# Patient Record
Sex: FEMALE | Race: WHITE | NOT HISPANIC OR LATINO | Employment: OTHER | URBAN - METROPOLITAN AREA
[De-identification: names, ages, dates, MRNs, and addresses within clinical notes are randomized per-mention and may not be internally consistent; named-entity substitution may affect disease eponyms.]

---

## 2019-06-10 ENCOUNTER — TRANSCRIBE ORDERS (OUTPATIENT)
Dept: LAB | Facility: CLINIC | Age: 69
End: 2019-06-10

## 2019-06-10 ENCOUNTER — APPOINTMENT (OUTPATIENT)
Dept: LAB | Facility: CLINIC | Age: 69
End: 2019-06-10
Payer: MEDICARE

## 2019-06-10 DIAGNOSIS — I10 ESSENTIAL HYPERTENSION, MALIGNANT: ICD-10-CM

## 2019-06-10 DIAGNOSIS — I10 ESSENTIAL HYPERTENSION, MALIGNANT: Primary | ICD-10-CM

## 2019-06-10 LAB
ALBUMIN SERPL BCP-MCNC: 3.8 G/DL (ref 3.5–5)
ALP SERPL-CCNC: 63 U/L (ref 46–116)
ALT SERPL W P-5'-P-CCNC: 28 U/L (ref 12–78)
ANION GAP SERPL CALCULATED.3IONS-SCNC: 6 MMOL/L (ref 4–13)
AST SERPL W P-5'-P-CCNC: 19 U/L (ref 5–45)
BASOPHILS # BLD AUTO: 0.03 THOUSANDS/ΜL (ref 0–0.1)
BASOPHILS NFR BLD AUTO: 1 % (ref 0–1)
BILIRUB SERPL-MCNC: 0.3 MG/DL (ref 0.2–1)
BUN SERPL-MCNC: 16 MG/DL (ref 5–25)
CALCIUM SERPL-MCNC: 9.2 MG/DL (ref 8.3–10.1)
CHLORIDE SERPL-SCNC: 106 MMOL/L (ref 100–108)
CHOLEST SERPL-MCNC: 220 MG/DL (ref 50–200)
CO2 SERPL-SCNC: 31 MMOL/L (ref 21–32)
CREAT SERPL-MCNC: 0.85 MG/DL (ref 0.6–1.3)
EOSINOPHIL # BLD AUTO: 0.14 THOUSAND/ΜL (ref 0–0.61)
EOSINOPHIL NFR BLD AUTO: 4 % (ref 0–6)
ERYTHROCYTE [DISTWIDTH] IN BLOOD BY AUTOMATED COUNT: 13.2 % (ref 11.6–15.1)
GFR SERPL CREATININE-BSD FRML MDRD: 71 ML/MIN/1.73SQ M
GLUCOSE P FAST SERPL-MCNC: 98 MG/DL (ref 65–99)
HCT VFR BLD AUTO: 41.9 % (ref 34.8–46.1)
HCV AB SER QL: NORMAL
HDLC SERPL-MCNC: 93 MG/DL (ref 40–60)
HGB BLD-MCNC: 13.3 G/DL (ref 11.5–15.4)
IMM GRANULOCYTES # BLD AUTO: 0.01 THOUSAND/UL (ref 0–0.2)
IMM GRANULOCYTES NFR BLD AUTO: 0 % (ref 0–2)
LDLC SERPL CALC-MCNC: 117 MG/DL (ref 0–100)
LYMPHOCYTES # BLD AUTO: 1.27 THOUSANDS/ΜL (ref 0.6–4.47)
LYMPHOCYTES NFR BLD AUTO: 36 % (ref 14–44)
MCH RBC QN AUTO: 30.1 PG (ref 26.8–34.3)
MCHC RBC AUTO-ENTMCNC: 31.7 G/DL (ref 31.4–37.4)
MCV RBC AUTO: 95 FL (ref 82–98)
MONOCYTES # BLD AUTO: 0.53 THOUSAND/ΜL (ref 0.17–1.22)
MONOCYTES NFR BLD AUTO: 15 % (ref 4–12)
NEUTROPHILS # BLD AUTO: 1.51 THOUSANDS/ΜL (ref 1.85–7.62)
NEUTS SEG NFR BLD AUTO: 44 % (ref 43–75)
NONHDLC SERPL-MCNC: 127 MG/DL
NRBC BLD AUTO-RTO: 0 /100 WBCS
PLATELET # BLD AUTO: 206 THOUSANDS/UL (ref 149–390)
PMV BLD AUTO: 11.6 FL (ref 8.9–12.7)
POTASSIUM SERPL-SCNC: 3.5 MMOL/L (ref 3.5–5.3)
PROT SERPL-MCNC: 7.4 G/DL (ref 6.4–8.2)
RBC # BLD AUTO: 4.42 MILLION/UL (ref 3.81–5.12)
SODIUM SERPL-SCNC: 143 MMOL/L (ref 136–145)
TRIGL SERPL-MCNC: 52 MG/DL
TSH SERPL DL<=0.05 MIU/L-ACNC: 3.13 UIU/ML (ref 0.36–3.74)
WBC # BLD AUTO: 3.49 THOUSAND/UL (ref 4.31–10.16)

## 2019-06-10 PROCEDURE — 85025 COMPLETE CBC W/AUTO DIFF WBC: CPT

## 2019-06-10 PROCEDURE — 36415 COLL VENOUS BLD VENIPUNCTURE: CPT

## 2019-06-10 PROCEDURE — 80053 COMPREHEN METABOLIC PANEL: CPT

## 2019-06-10 PROCEDURE — 84443 ASSAY THYROID STIM HORMONE: CPT

## 2019-06-10 PROCEDURE — 80061 LIPID PANEL: CPT

## 2019-06-10 PROCEDURE — 86803 HEPATITIS C AB TEST: CPT

## 2020-07-20 ENCOUNTER — TRANSCRIBE ORDERS (OUTPATIENT)
Dept: LAB | Facility: CLINIC | Age: 70
End: 2020-07-20

## 2020-07-20 ENCOUNTER — APPOINTMENT (OUTPATIENT)
Dept: LAB | Facility: CLINIC | Age: 70
End: 2020-07-20
Payer: MEDICARE

## 2020-07-20 DIAGNOSIS — I10 ESSENTIAL HYPERTENSION, MALIGNANT: Primary | ICD-10-CM

## 2020-07-20 DIAGNOSIS — I10 ESSENTIAL HYPERTENSION, MALIGNANT: ICD-10-CM

## 2020-07-20 LAB
ALBUMIN SERPL BCP-MCNC: 3.9 G/DL (ref 3.5–5)
ALP SERPL-CCNC: 65 U/L (ref 46–116)
ALT SERPL W P-5'-P-CCNC: 27 U/L (ref 12–78)
ANION GAP SERPL CALCULATED.3IONS-SCNC: 5 MMOL/L (ref 4–13)
AST SERPL W P-5'-P-CCNC: 23 U/L (ref 5–45)
BASOPHILS # BLD AUTO: 0.02 THOUSANDS/ΜL (ref 0–0.1)
BASOPHILS NFR BLD AUTO: 1 % (ref 0–1)
BILIRUB SERPL-MCNC: 0.35 MG/DL (ref 0.2–1)
BUN SERPL-MCNC: 16 MG/DL (ref 5–25)
CALCIUM SERPL-MCNC: 9 MG/DL (ref 8.3–10.1)
CHLORIDE SERPL-SCNC: 105 MMOL/L (ref 100–108)
CHOLEST SERPL-MCNC: 241 MG/DL (ref 50–200)
CO2 SERPL-SCNC: 31 MMOL/L (ref 21–32)
CREAT SERPL-MCNC: 0.77 MG/DL (ref 0.6–1.3)
EOSINOPHIL # BLD AUTO: 0.15 THOUSAND/ΜL (ref 0–0.61)
EOSINOPHIL NFR BLD AUTO: 4 % (ref 0–6)
ERYTHROCYTE [DISTWIDTH] IN BLOOD BY AUTOMATED COUNT: 13.3 % (ref 11.6–15.1)
GFR SERPL CREATININE-BSD FRML MDRD: 80 ML/MIN/1.73SQ M
GLUCOSE P FAST SERPL-MCNC: 98 MG/DL (ref 65–99)
HCT VFR BLD AUTO: 44.5 % (ref 34.8–46.1)
HDLC SERPL-MCNC: 89 MG/DL
HGB BLD-MCNC: 13.9 G/DL (ref 11.5–15.4)
IMM GRANULOCYTES # BLD AUTO: 0.01 THOUSAND/UL (ref 0–0.2)
IMM GRANULOCYTES NFR BLD AUTO: 0 % (ref 0–2)
LDLC SERPL CALC-MCNC: 138 MG/DL (ref 0–100)
LYMPHOCYTES # BLD AUTO: 1.36 THOUSANDS/ΜL (ref 0.6–4.47)
LYMPHOCYTES NFR BLD AUTO: 37 % (ref 14–44)
MCH RBC QN AUTO: 29.8 PG (ref 26.8–34.3)
MCHC RBC AUTO-ENTMCNC: 31.2 G/DL (ref 31.4–37.4)
MCV RBC AUTO: 95 FL (ref 82–98)
MONOCYTES # BLD AUTO: 0.51 THOUSAND/ΜL (ref 0.17–1.22)
MONOCYTES NFR BLD AUTO: 14 % (ref 4–12)
NEUTROPHILS # BLD AUTO: 1.65 THOUSANDS/ΜL (ref 1.85–7.62)
NEUTS SEG NFR BLD AUTO: 44 % (ref 43–75)
NONHDLC SERPL-MCNC: 152 MG/DL
NRBC BLD AUTO-RTO: 0 /100 WBCS
PLATELET # BLD AUTO: 186 THOUSANDS/UL (ref 149–390)
PMV BLD AUTO: 11.3 FL (ref 8.9–12.7)
POTASSIUM SERPL-SCNC: 4.3 MMOL/L (ref 3.5–5.3)
PROT SERPL-MCNC: 7.5 G/DL (ref 6.4–8.2)
RBC # BLD AUTO: 4.67 MILLION/UL (ref 3.81–5.12)
SODIUM SERPL-SCNC: 141 MMOL/L (ref 136–145)
TRIGL SERPL-MCNC: 70 MG/DL
TSH SERPL DL<=0.05 MIU/L-ACNC: 3.28 UIU/ML (ref 0.36–3.74)
WBC # BLD AUTO: 3.7 THOUSAND/UL (ref 4.31–10.16)

## 2020-07-20 PROCEDURE — 84443 ASSAY THYROID STIM HORMONE: CPT

## 2020-07-20 PROCEDURE — 80053 COMPREHEN METABOLIC PANEL: CPT

## 2020-07-20 PROCEDURE — 85025 COMPLETE CBC W/AUTO DIFF WBC: CPT

## 2020-07-20 PROCEDURE — 80061 LIPID PANEL: CPT

## 2020-07-20 PROCEDURE — 36415 COLL VENOUS BLD VENIPUNCTURE: CPT

## 2021-01-29 ENCOUNTER — TELEPHONE (OUTPATIENT)
Dept: SLEEP CENTER | Facility: CLINIC | Age: 71
End: 2021-01-29

## 2021-01-29 ENCOUNTER — OFFICE VISIT (OUTPATIENT)
Dept: SLEEP CENTER | Facility: CLINIC | Age: 71
End: 2021-01-29
Payer: MEDICARE

## 2021-01-29 VITALS
WEIGHT: 168 LBS | HEIGHT: 62 IN | DIASTOLIC BLOOD PRESSURE: 80 MMHG | BODY MASS INDEX: 30.91 KG/M2 | SYSTOLIC BLOOD PRESSURE: 140 MMHG

## 2021-01-29 DIAGNOSIS — Z20.822 ENCOUNTER FOR PREPROCEDURE SCREENING LABORATORY TESTING FOR COVID-19: Primary | ICD-10-CM

## 2021-01-29 DIAGNOSIS — Z01.812 ENCOUNTER FOR PREPROCEDURE SCREENING LABORATORY TESTING FOR COVID-19: Primary | ICD-10-CM

## 2021-01-29 DIAGNOSIS — G47.33 OSA (OBSTRUCTIVE SLEEP APNEA): Primary | ICD-10-CM

## 2021-01-29 DIAGNOSIS — Z71.89 CPAP USE COUNSELING: ICD-10-CM

## 2021-01-29 PROCEDURE — 99204 OFFICE O/P NEW MOD 45 MIN: CPT | Performed by: PSYCHIATRY & NEUROLOGY

## 2021-01-29 RX ORDER — VENLAFAXINE 75 MG/1
75 TABLET ORAL
COMMUNITY
Start: 2020-11-27

## 2021-01-29 RX ORDER — AMLODIPINE BESYLATE 10 MG/1
10 TABLET ORAL DAILY
COMMUNITY

## 2021-01-29 NOTE — PATIENT INSTRUCTIONS
Recommendations:  1) APAP 15-20cm with FFM and CPAP titration with change to BiPAP if needed  2) Driving safety was reviewed with patient  If the patient feels too sleepy to drive she knows not to drive  If she becomes sleepy while driving she will pull over and nap  3) Follow-up after testing  4) Call with any questions or concerns

## 2021-01-29 NOTE — LETTER
2021     Pierre Marley, [address] [facility] [address]    Patient: Naun Hawkins   YOB: 1950   Date of Visit: 2021       Dear Dr Jorden Solis: Thank you for referring Naun Hawkins to me for evaluation  Below are my notes for this consultation  If you have questions, please do not hesitate to call me  I look forward to following your patient along with you  Sincerely,        Jasper Padilla MD        CC: DO Jasper Shaw MD  2021  9:33 AM  Sign when Signing Visit  Sleep Medicine Consultation Note    HPI:  Ms Naun Hawkins is a 72 y o  female seen at the request of Nitin Rojo DO for advice regarding suspected sleep disordered breathing  The patient had an HST in 2018 and has been on CPAP since then  She is still complaining of having tiredness and sleepiness at times  She is considering the Radha device  Nitin Rojo DO has been managing her CPAP for years, but every time it is adjusted she is still tired  Her  stated that she snores lightly with her CPAP  When she takes it off, it is very loud  She does not like the CPAP and feels the mask is uncomfortable she has a FFM and it is too big and leaking  She changes her mask once a month and filters twice a month  NOVOSOM HST from 2018: AHI 3% 46 7, 4% 41 2, blanca oxygen 76%      Please see below for continuation of the HPI:    Compliance Card Data:    Date Range: 20-21   Settin-18cm; avg pressure 14cm; 90th% 17 5cm   Residual AHI: 14   Vibratory Snore Index: n a   %  Night in Large Leak: 0 sec   Average usage days used: 6h 37m   % Days used: 93%   % Days with USage > 4 hrs: 83%      Sleep Disordered Breathing:  -Snoring: yes   -Severity: loud   -Frequency: nightly   -Duration: many years   -Over time: better   -Modifying factors: CPAP helps  -Observed Apneas: no  -Mouth Breathing at night: yes  -Dry Mouth in morning: yes   -Nocturnal Gasping: yes  -Nasal Obstruction: no  -Weight: gained 5-10 lbs    Sleep Pattern:  -Location: bedroom   -Bed/Recliner/Wedge: bed  -Bed Partner: yes  -HOB: flat  -# of pillows under head: 1  -Position: side  -Bedtime: 1030-11pm  -Lights out: same time  -Environmental: No lights/TV  -Latency: can be right away or takes a long time  -Awakenings: 1-2   -Reason: feeling hot   -Duration: sometimes able to get back to sleep  -Wake time: 630-7am   -Alarm: no  -Rise time: same time  -Days off: same  -Shift Work: retired  -Patient's estimate of total sleep time: 7h    Daytime Symptoms:  -Upon Awakening: some days she feels tired  -Daytime fatigue/sleepiness: tired and sleepy in the afternoon  -Naps: once a day, but not daily  -Involuntary Dozing: yes  -Cognitive Symptoms: decreased memory  -Driving: Difficulty with sleepiness and driving:  denied   -- Close calls related to sleepiness: denied   -- Accidents related to sleepiness: denied      Questionnaires:   Sitting and reading: Moderate chance of dozing  Watching TV: Moderate chance of dozing  Sitting, inactive in a public place (e g  a theatre or a meeting): Would never doze  As a passenger in a car for an hour without a break: Slight chance of dozing  Lying down to rest in the afternoon when circumstances permit: High chance of dozing  Sitting and talking to someone: Would never doze  Sitting quietly after a lunch without alcohol: Slight chance of dozing  In a car, while stopped for a few minutes in traffic: Would never doze  Total score: 9      Sleep Review of Symptoms:  -Parasomnias:  --Sleep Walking: no  --Dream Enactment: no  --Bruxism: no  -Motor:  --RLS: no  --PLMS: no  -Narcolepsy:  --Hallucinations: no  --Paralysis: no  --Cataplexy: no    Childhood Sleep History: denied    Prior Sleep Studies/Evaluations:  HST 2018    Family History:  Family history of sleep disorders: sister with HERI and dad had HERI    There is no problem list on file for this patient      Past Medical History:   Diagnosis Date    Anxiety     Hypertension     HERI (obstructive sleep apnea)          --> Denies any cardiopulmonary disease  --> Seizure hx: denies  --> Head injury with LOC: denies  --> Supplemental Oxygen Use: denies    Labs     Results for Sarita Hendricks (MRN [id number]) as of 1/29/2021 08:44   Ref   Range 7/20/2020 08:19   Sodium Latest Ref Range: 136 - 145 mmol/L 141   Potassium Latest Ref Range: 3 5 - 5 3 mmol/L 4 3   Chloride Latest Ref Range: 100 - 108 mmol/L 105   CO2 Latest Ref Range: 21 - 32 mmol/L 31   Anion Gap Latest Ref Range: 4 - 13 mmol/L 5   BUN Latest Ref Range: 5 - 25 mg/dL 16   Creatinine Latest Ref Range: 0 60 - 1 30 mg/dL 0 77   GLUCOSE FASTING Latest Ref Range: 65 - 99 mg/dL 98   Calcium Latest Ref Range: 8 3 - 10 1 mg/dL 9 0   AST Latest Ref Range: 5 - 45 U/L 23   ALT Latest Ref Range: 12 - 78 U/L 27   Alkaline Phosphatase Latest Ref Range: 46 - 116 U/L 65   Total Protein Latest Ref Range: 6 4 - 8 2 g/dL 7 5   Albumin Latest Ref Range: 3 5 - 5 0 g/dL 3 9   TOTAL BILIRUBIN Latest Ref Range: 0 20 - 1 00 mg/dL 0 35   eGFR Latest Units: ml/min/1 73sq m 80   Cholesterol Latest Ref Range: 50 - 200 mg/dL 241 (H)   Triglycerides Latest Ref Range: <=150 mg/dL 70   HDL Latest Ref Range: >=40 mg/dL 89   Non-HDL Cholesterol Latest Units: mg/dl 152   LDL Calculated Latest Ref Range: 0 - 100 mg/dL 138 (H)   WBC Latest Ref Range: 4 31 - 10 16 Thousand/uL 3 70 (L)   Red Blood Cell Count Latest Ref Range: 3 81 - 5 12 Million/uL 4 67   Hemoglobin Latest Ref Range: 11 5 - 15 4 g/dL 13 9   HCT Latest Ref Range: 34 8 - 46 1 % 44 5   MCV Latest Ref Range: 82 - 98 fL 95   MCH Latest Ref Range: 26 8 - 34 3 pg 29 8   MCHC Latest Ref Range: 31 4 - 37 4 g/dL 31 2 (L)   RDW Latest Ref Range: 11 6 - 15 1 % 13 3   Platelet Count Latest Ref Range: 149 - 390 Thousands/uL 186   MPV Latest Ref Range: 8 9 - 12 7 fL 11 3   nRBC Latest Units: /100 WBCs 0   Neutrophils % Latest Ref Range: 43 - 75 % 44   Immat GRANS % Latest Ref Range: 0 - 2 % 0   Lymphocytes Relative Latest Ref Range: 14 - 44 % 37   Monocytes Relative Latest Ref Range: 4 - 12 % 14 (H)   Eosinophils Latest Ref Range: 0 - 6 % 4   Basophils Relative Latest Ref Range: 0 - 1 % 1   Immature Grans Absolute Latest Ref Range: 0 00 - 0 20 Thousand/uL 0 01   Absolute Neutrophils Latest Ref Range: 1 85 - 7 62 Thousands/µL 1 65 (L)   Lymphocytes Absolute Latest Ref Range: 0 60 - 4 47 Thousands/µL 1 36   Absolute Monocytes Latest Ref Range: 0 17 - 1 22 Thousand/µL 0 51   Absolute Eosinophils Latest Ref Range: 0 00 - 0 61 Thousand/µL 0 15   Basophils Absolute Latest Ref Range: 0 00 - 0 10 Thousands/µL 0 02   TSH 3RD GENERATON Latest Ref Range: 0 358 - 3 740 uIU/mL 3 278     No past surgical history on file  --> ENT procedures: denies    Current Outpatient Medications   Medication Sig Dispense Refill    amLODIPine (NORVASC) 10 mg tablet Take 10 mg by mouth daily      venlafaxine (EFFEXOR) 75 mg tablet 75 mg 1/2 tablet       No current facility-administered medications for this visit            Social History:  -Employment: retial manager retiered  -Smoking: no  -Caffeine: 16oz of coffee a day with 1/2 decaf  -Alcohol: 1-2 glasses of wine a night, but none since NYE  -THC: no  -OTC/Supplements/herbals: no  -Illicits:  denies  -Family: lives with     ROS:  Genitourinary none   Cardiology ankle/leg swelling   Gastrointestinal none   Neurology forgetfulness, poor concentration or confusion,  and difficulty with memory   Constitutional weight change   Integumentary none   Psychiatry none   Musculoskeletal none   Pulmonary shortness of breath with activity and snoring   ENT none   Endocrine none   Hematological none       MSE:  -Alert and appropriate: alert, calm, cooperative  -Oriented to person, place and time:  name, age, location, day/date/mon/yr  -Behavior: good, sustained eye contact  -Speech: Unremarkable rate/rhythm/volume  -Mood: "okay"  -Affect: full range  -Thought Processes: linear, logical, goal directed  PE:  Body mass index is 30 73 kg/m²  Vitals:    01/29/21 0832   BP: 140/80   Weight: 76 2 kg (168 lb)   Height: 5' 2" (1 575 m)       -General:  In NAD    -Eyes: Conjunctival injection: none     -EOM:  PERRLA, EOMI   -Eyelid hooding: yes    -ENT: MP: 4/4   -Facial deformity: no retrognathia   -Hard palate: moderate to high arch   -Soft palate:  Crowding and blocked by redundant tissue   -Gums and teeth: normal dentition   -Tongue:  Scalloping   -Nares:  Patent    -Neck/Lymphatics: Lymphadenopathy:  none appreciated   -Masses:  none appreciated   -Circumference: Neck Circumference: 15 "    -Cardiac: Auscultation:  RRR   - LE edema over shins: none appreciated    -Pulm: -Respirations: unlaboured         -Auscultation:  CTA bilaterally, posterior fields    -Neuro: No resting tremor     -Musculoskeletal: Gait and stance: normal turning and ambulation; unremarkable  Assessment:  Ms Elan Archer is a 72 y o  female who is seen to treat obstructive sleep apnea  She has severe HERI and on APAP 4-18cm (she has been compliant), but she is still having daytime tiredness and sleepiness  She is still lightly snoring on the current pressure  Her pressure needs are high and her APAP setting are insufficient based on her compliance report  Will increase her pressure range and put in for a CPAP to BiPAP titration as her pressure needs may exceed the capability of a CPAP  The pathophysiology of, the reasons to treat and treatment options for obstructive sleep apnea were all reviewed with the patient and her  today  We discussed Inspire and if she doesn't get full benefit from CPAP/BiPAP that adequately treats her HERI, will see Dr Neena Hills for [facility] evaluation  She is amenable to treatment with PAP therapy   Discussed keeping nasal passages clear, abstaining from alcohol, and other sedating drugs at night- which will worsen symptoms of HERI       --History provided by: patient and   --Records reviewed: in chart and HST, compliance report      Recommendations:  1) APAP 15-20cm with FFM and CPAP titration with change to BiPAP if needed  2) Driving safety was reviewed with patient  If the patient feels too sleepy to drive she knows not to drive  If she becomes sleepy while driving she will pull over and nap  3) Follow-up after testing  4) Call with any questions or concerns  All questions answered for the patient and , who indicated understanding and agreed with the plan       Adriel Manning MD  Psychiatry/ Sleep medicine

## 2021-01-29 NOTE — TELEPHONE ENCOUNTER
1/29 Patient informed that COVID testing is to be performed 10 days prior to PAP study  Patient is to tell site that it is needed for a procedure so it is expedited  Testing site information provided  -[place]

## 2021-01-29 NOTE — PROGRESS NOTES
Sleep Medicine Consultation Note    HPI:  Ms Aury Bonner is a 72 y o  female seen at the request of Maykel Nogueira DO for advice regarding suspected sleep disordered breathing  The patient had an HST in 2018 and has been on CPAP since then  She is still complaining of having tiredness and sleepiness at times  She is considering the [place] device  Maykel Nogueira DO has been managing her CPAP for years, but every time it is adjusted she is still tired  Her  stated that she snores lightly with her CPAP  When she takes it off, it is very loud  She does not like the CPAP and feels the mask is uncomfortable she has a FFM and it is too big and leaking  She changes her mask once a month and filters twice a month  NOVOSOM HST from 2018: AHI 3% 46 7, 4% 41 2, blanca oxygen 76%      Please see below for continuation of the HPI:    Compliance Card Data:    Date Range: 20-21   Settin-18cm; avg pressure 14cm; 90th% 17 5cm   Residual AHI: 14   Vibratory Snore Index: n a   %  Night in Large Leak: 0 sec   Average usage days used: 6h 37m   % Days used: 93%   % Days with USage > 4 hrs: 83%      Sleep Disordered Breathing:  -Snoring: yes   -Severity: loud   -Frequency: nightly   -Duration: many years   -Over time: better   -Modifying factors: CPAP helps  -Observed Apneas: no  -Mouth Breathing at night: yes  -Dry Mouth in morning: yes   -Nocturnal Gasping: yes  -Nasal Obstruction: no  -Weight: gained 5-10 lbs    Sleep Pattern:  -Location: bedroom   -Bed/Recliner/Wedge: bed  -Bed Partner: yes  -HOB: flat  -# of pillows under head: 1  -Position: side  -Bedtime: 1030-11pm  -Lights out: same time  -Environmental: No lights/TV  -Latency: can be right away or takes a long time  -Awakenings: 1-2   -Reason: feeling hot   -Duration: sometimes able to get back to sleep  -Wake time: 630-7am   -Alarm: no  -Rise time: same time  -Days off: same  -Shift Work: retired  -Patient's estimate of total sleep time: 7h    Daytime Symptoms:  -Upon Awakening: some days she feels tired  -Daytime fatigue/sleepiness: tired and sleepy in the afternoon  -Naps: once a day, but not daily  -Involuntary Dozing: yes  -Cognitive Symptoms: decreased memory  -Driving: Difficulty with sleepiness and driving:  denied   -- Close calls related to sleepiness: denied   -- Accidents related to sleepiness: denied      Questionnaires:   Sitting and reading: Moderate chance of dozing  Watching TV: Moderate chance of dozing  Sitting, inactive in a public place (e g  a theatre or a meeting): Would never doze  As a passenger in a car for an hour without a break: Slight chance of dozing  Lying down to rest in the afternoon when circumstances permit: High chance of dozing  Sitting and talking to someone: Would never doze  Sitting quietly after a lunch without alcohol: Slight chance of dozing  In a car, while stopped for a few minutes in traffic: Would never doze  Total score: 9      Sleep Review of Symptoms:  -Parasomnias:  --Sleep Walking: no  --Dream Enactment: no  --Bruxism: no  -Motor:  --RLS: no  --PLMS: no  -Narcolepsy:  --Hallucinations: no  --Paralysis: no  --Cataplexy: no    Childhood Sleep History: denied    Prior Sleep Studies/Evaluations:  HST 2018    Family History:  Family history of sleep disorders: sister with HERI and dad had HERI    There is no problem list on file for this patient  Past Medical History:   Diagnosis Date    Anxiety     Hypertension     HERI (obstructive sleep apnea)          --> Denies any cardiopulmonary disease  --> Seizure hx: denies  --> Head injury with LOC: denies  --> Supplemental Oxygen Use: denies    Labs     Results for Heather Herbert (MRN [id number]) as of 1/29/2021 08:44   Ref   Range 7/20/2020 08:19   Sodium Latest Ref Range: 136 - 145 mmol/L 141   Potassium Latest Ref Range: 3 5 - 5 3 mmol/L 4 3   Chloride Latest Ref Range: 100 - 108 mmol/L 105   CO2 Latest Ref Range: 21 - 32 mmol/L 31   Anion Gap Latest Ref Range: 4 - 13 mmol/L 5   BUN Latest Ref Range: 5 - 25 mg/dL 16   Creatinine Latest Ref Range: 0 60 - 1 30 mg/dL 0 77   GLUCOSE FASTING Latest Ref Range: 65 - 99 mg/dL 98   Calcium Latest Ref Range: 8 3 - 10 1 mg/dL 9 0   AST Latest Ref Range: 5 - 45 U/L 23   ALT Latest Ref Range: 12 - 78 U/L 27   Alkaline Phosphatase Latest Ref Range: 46 - 116 U/L 65   Total Protein Latest Ref Range: 6 4 - 8 2 g/dL 7 5   Albumin Latest Ref Range: 3 5 - 5 0 g/dL 3 9   TOTAL BILIRUBIN Latest Ref Range: 0 20 - 1 00 mg/dL 0 35   eGFR Latest Units: ml/min/1 73sq m 80   Cholesterol Latest Ref Range: 50 - 200 mg/dL 241 (H)   Triglycerides Latest Ref Range: <=150 mg/dL 70   HDL Latest Ref Range: >=40 mg/dL 89   Non-HDL Cholesterol Latest Units: mg/dl 152   LDL Calculated Latest Ref Range: 0 - 100 mg/dL 138 (H)   WBC Latest Ref Range: 4 31 - 10 16 Thousand/uL 3 70 (L)   Red Blood Cell Count Latest Ref Range: 3 81 - 5 12 Million/uL 4 67   Hemoglobin Latest Ref Range: 11 5 - 15 4 g/dL 13 9   HCT Latest Ref Range: 34 8 - 46 1 % 44 5   MCV Latest Ref Range: 82 - 98 fL 95   MCH Latest Ref Range: 26 8 - 34 3 pg 29 8   MCHC Latest Ref Range: 31 4 - 37 4 g/dL 31 2 (L)   RDW Latest Ref Range: 11 6 - 15 1 % 13 3   Platelet Count Latest Ref Range: 149 - 390 Thousands/uL 186   MPV Latest Ref Range: 8 9 - 12 7 fL 11 3   nRBC Latest Units: /100 WBCs 0   Neutrophils % Latest Ref Range: 43 - 75 % 44   Immat GRANS % Latest Ref Range: 0 - 2 % 0   Lymphocytes Relative Latest Ref Range: 14 - 44 % 37   Monocytes Relative Latest Ref Range: 4 - 12 % 14 (H)   Eosinophils Latest Ref Range: 0 - 6 % 4   Basophils Relative Latest Ref Range: 0 - 1 % 1   Immature Grans Absolute Latest Ref Range: 0 00 - 0 20 Thousand/uL 0 01   Absolute Neutrophils Latest Ref Range: 1 85 - 7 62 Thousands/µL 1 65 (L)   Lymphocytes Absolute Latest Ref Range: 0 60 - 4 47 Thousands/µL 1 36   Absolute Monocytes Latest Ref Range: 0 17 - 1 22 Thousand/µL 0 51   Absolute Eosinophils Latest Ref Range: 0 00 - 0 61 Thousand/µL 0 15   Basophils Absolute Latest Ref Range: 0 00 - 0 10 Thousands/µL 0 02   TSH 3RD GENERATON Latest Ref Range: 0 358 - 3 740 uIU/mL 3 278     No past surgical history on file  --> ENT procedures: denies    Current Outpatient Medications   Medication Sig Dispense Refill    amLODIPine (NORVASC) 10 mg tablet Take 10 mg by mouth daily      venlafaxine (EFFEXOR) 75 mg tablet 75 mg 1/2 tablet       No current facility-administered medications for this visit  Social History:  -Employment: retial manager retiered  -Smoking: no  -Caffeine: 16oz of coffee a day with 1/2 decaf  -Alcohol: 1-2 glasses of wine a night, but none since ROMANIE  -THC: no  -OTC/Supplements/herbals: no  -Illicits:  denies  -Family: lives with     ROS:  Genitourinary none   Cardiology ankle/leg swelling   Gastrointestinal none   Neurology forgetfulness, poor concentration or confusion,  and difficulty with memory   Constitutional weight change   Integumentary none   Psychiatry none   Musculoskeletal none   Pulmonary shortness of breath with activity and snoring   ENT none   Endocrine none   Hematological none       MSE:  -Alert and appropriate: alert, calm, cooperative  -Oriented to person, place and time:  name, age, location, day/date/mon/yr  -Behavior: good, sustained eye contact  -Speech: Unremarkable rate/rhythm/volume  -Mood: "okay"  -Affect: full range  -Thought Processes: linear, logical, goal directed  PE:  Body mass index is 30 73 kg/m²    Vitals:    01/29/21 0832   BP: 140/80   Weight: 76 2 kg (168 lb)   Height: 5' 2" (1 575 m)       -General:  In NAD    -Eyes: Conjunctival injection: none     -EOM:  PERRLA, EOMI   -Eyelid hooding: yes    -ENT: MP: 4/4   -Facial deformity: no retrognathia   -Hard palate: moderate to high arch   -Soft palate:  Crowding and blocked by redundant tissue   -Gums and teeth: normal dentition   -Tongue:  Scalloping   -Nares:  Patent    -Neck/Lymphatics: Lymphadenopathy:  none appreciated   -Masses:  none appreciated   -Circumference: Neck Circumference: 15 "    -Cardiac: Auscultation:  RRR   - LE edema over shins: none appreciated    -Pulm: -Respirations: unlaboured         -Auscultation:  CTA bilaterally, posterior fields    -Neuro: No resting tremor     -Musculoskeletal: Gait and stance: normal turning and ambulation; unremarkable  Assessment:  Ms Perlita Roberts is a 72 y o  female who is seen to treat obstructive sleep apnea  She has severe HERI and on APAP 4-18cm (she has been compliant), but she is still having daytime tiredness and sleepiness  She is still lightly snoring on the current pressure  Her pressure needs are high and her APAP setting are insufficient based on her compliance report  Will increase her pressure range and put in for a CPAP to BiPAP titration as her pressure needs may exceed the capability of a CPAP  The pathophysiology of, the reasons to treat and treatment options for obstructive sleep apnea were all reviewed with the patient and her  today  We discussed Inspire and if she doesn't get full benefit from CPAP/BiPAP that adequately treats her HERI, will see Dr Brooklyn Nevarez for [facility] evaluation  She is amenable to treatment with PAP therapy  Discussed keeping nasal passages clear, abstaining from alcohol, and other sedating drugs at night- which will worsen symptoms of HERI  --History provided by: patient and   --Records reviewed: in chart and HST, compliance report      Recommendations:  1) APAP 15-20cm with FFM and CPAP titration with change to BiPAP if needed  2) Driving safety was reviewed with patient  If the patient feels too sleepy to drive she knows not to drive  If she becomes sleepy while driving she will pull over and nap  3) Follow-up after testing  4) Call with any questions or concerns  All questions answered for the patient and , who indicated understanding and agreed with the plan  Aditi Cook MD  Psychiatry/ Sleep medicine

## 2021-02-03 ENCOUNTER — TELEPHONE (OUTPATIENT)
Dept: SLEEP CENTER | Facility: CLINIC | Age: 71
End: 2021-02-03

## 2021-02-03 NOTE — TELEPHONE ENCOUNTER
Left message for the patient to call back with DME provider information      Order in [place] for pressure change

## 2021-02-03 NOTE — TELEPHONE ENCOUNTER
Spoke to patient  States her DME provider is [facility] representative made aware of pressure change order

## 2021-02-16 ENCOUNTER — TELEPHONE (OUTPATIENT)
Dept: SLEEP CENTER | Facility: CLINIC | Age: 71
End: 2021-02-16

## 2021-02-16 NOTE — TELEPHONE ENCOUNTER
Received call from patient stating at her appointment with Dr Hua Castaneda last month, a pressure change was ordered for her CPAP machine and she does not think it was done  Spoke with [employer] representative who states it had not been completed at that time  Pressure change now completed    Patient made aware

## 2021-03-24 DIAGNOSIS — Z01.812 ENCOUNTER FOR PREPROCEDURE SCREENING LABORATORY TESTING FOR COVID-19: ICD-10-CM

## 2021-03-24 DIAGNOSIS — Z20.822 ENCOUNTER FOR PREPROCEDURE SCREENING LABORATORY TESTING FOR COVID-19: ICD-10-CM

## 2021-03-24 LAB — SARS-COV-2 RNA RESP QL NAA+PROBE: NEGATIVE

## 2021-03-24 PROCEDURE — U0005 INFEC AGEN DETEC AMPLI PROBE: HCPCS | Performed by: PSYCHIATRY & NEUROLOGY

## 2021-03-24 PROCEDURE — U0003 INFECTIOUS AGENT DETECTION BY NUCLEIC ACID (DNA OR RNA); SEVERE ACUTE RESPIRATORY SYNDROME CORONAVIRUS 2 (SARS-COV-2) (CORONAVIRUS DISEASE [COVID-19]), AMPLIFIED PROBE TECHNIQUE, MAKING USE OF HIGH THROUGHPUT TECHNOLOGIES AS DESCRIBED BY CMS-2020-01-R: HCPCS | Performed by: PSYCHIATRY & NEUROLOGY

## 2021-03-29 ENCOUNTER — TRANSCRIBE ORDERS (OUTPATIENT)
Dept: LAB | Facility: CLINIC | Age: 71
End: 2021-03-29

## 2021-04-02 ENCOUNTER — HOSPITAL ENCOUNTER (OUTPATIENT)
Dept: SLEEP CENTER | Facility: CLINIC | Age: 71
Discharge: HOME | End: 2021-04-02
Payer: MEDICARE

## 2021-04-02 DIAGNOSIS — Z71.89 CPAP USE COUNSELING: ICD-10-CM

## 2021-04-02 DIAGNOSIS — G47.33 OSA (OBSTRUCTIVE SLEEP APNEA): ICD-10-CM

## 2021-04-02 PROCEDURE — 95811 POLYSOM 6/>YRS CPAP 4/> PARM: CPT

## 2021-04-02 PROCEDURE — 95811 POLYSOM 6/>YRS CPAP 4/> PARM: CPT | Performed by: PSYCHIATRY & NEUROLOGY

## 2021-04-03 NOTE — PROGRESS NOTES
Sleep Study Documentation    Pre-Sleep Study       Sleep testing procedure explained to patient:YES    Patient napped prior to study:NO    [address] [place] worker after 12PM   Caffeine use:NO    Alcohol:Dayshift workers after 5PM: Alcohol use:NO    Typical day for patient:YES       Study Documentation    Sleep Study Indications: HERI, excessive daytime sleepiness  Sleep Study: Treatment   Optimal PAP pressure: 24/20cm  Leak:Small  Snore:Eliminated  REM Obtained:yes  Supplemental O2: no    Minimum SaO2 88%  Baseline SaO2 95%  PAP mask tried (list all) medium resmed airtouch F20  PAP mask choice (final) medium resmed airtouch F20  PAP mask type:full face  PAP pressure at which snoring was eliminated 14cm  Minimum SaO2 at final PAP pressure 89%  Mode of Therapy:BiPAP  ETCO2:No  CPAP changed to BiPAP:Yes  If yes why due to supine related obstructive events  obstructive     Mode of Therapy:BiPAP    EKG abnormalities: no     EEG abnormalities: no    Sleep Study Recorded < 2 hours: N/A    Sleep Study Recorded > 2 hours but incomplete study: N/A    Sleep Study Recorded 6 hours but no sleep obtained: NO    Patient classification: retired       Post-Sleep Study    Medication used at bedtime or during sleep study:NO    Patient reports time it took to fall asleep:30 to 60 minutes    Patient reports waking up during study:3 or more times  Patient reports returning to sleep in 10 to 30 minutes  Patient reports sleeping 4 to 6 hours without dreaming  Patient reports sleep during study:typical    Patient rated sleepiness: Somewhat sleepy or tired    PAP treatment:yes: Post PAP treatment patient reports feeling unsure if a change is noted and  would wear PAP mask at home

## 2021-04-06 DIAGNOSIS — G47.33 OSA (OBSTRUCTIVE SLEEP APNEA): Primary | ICD-10-CM

## 2021-04-13 ENCOUNTER — TELEPHONE (OUTPATIENT)
Dept: SLEEP CENTER | Facility: CLINIC | Age: 71
End: 2021-04-13

## 2021-04-13 NOTE — TELEPHONE ENCOUNTER
Spoke with patient, advised DR Silva recommends BiPAP    Scheduled DME set up 4/27/2021 in [facility] representative aware    Compliance follow up scheduled 6/3/2021

## 2021-04-13 NOTE — TELEPHONE ENCOUNTER
----- Message from Lionel Pope MD sent at 4/6/2021  1:51 PM EDT -----  CPAP study read  BiPAP ordered

## 2021-05-11 ENCOUNTER — TELEPHONE (OUTPATIENT)
Dept: SLEEP CENTER | Facility: CLINIC | Age: 71
End: 2021-05-11

## 2021-05-11 NOTE — TELEPHONE ENCOUNTER
Patient left voice message stating she recently started using BiPAP and is having difficulty  The pressure is too high  She is asking to have pressure lowered  Called patient  States she could not use the mask that was given to her at [facility] set up  She has been using the memory foam mask that she took home from the sleep study  States she has been getting in her 4 hours of compliance but has only been able to do so by repeatedly pushing the ramp button  Advised I will obtain compliance data from [facility] for Dr Rivka Wayne to review and will call her back with recommendations  Dr Rivka Wayne, please see compliance when available and advise

## 2021-05-17 ENCOUNTER — TELEPHONE (OUTPATIENT)
Dept: SLEEP CENTER | Facility: CLINIC | Age: 71
End: 2021-05-17

## 2021-05-17 DIAGNOSIS — G47.33 OSA (OBSTRUCTIVE SLEEP APNEA): Primary | ICD-10-CM

## 2021-05-17 NOTE — TELEPHONE ENCOUNTER
Spoke with patient, advised above  Advised pressure change will be given to [facility] representative, advised she might not notice change for 24-48 hours  Advised patient to call with questions or concerns prior to visit 6/3/2021    Luciano Chino, can you please make the change?   Thanks

## 2021-06-03 ENCOUNTER — OFFICE VISIT (OUTPATIENT)
Dept: SLEEP CENTER | Facility: CLINIC | Age: 71
End: 2021-06-03
Payer: MEDICARE

## 2021-06-03 VITALS
BODY MASS INDEX: 31.32 KG/M2 | DIASTOLIC BLOOD PRESSURE: 60 MMHG | WEIGHT: 170.2 LBS | SYSTOLIC BLOOD PRESSURE: 100 MMHG | HEIGHT: 62 IN

## 2021-06-03 DIAGNOSIS — G47.33 OSA (OBSTRUCTIVE SLEEP APNEA): Primary | ICD-10-CM

## 2021-06-03 PROCEDURE — 99213 OFFICE O/P EST LOW 20 MIN: CPT | Performed by: PSYCHIATRY & NEUROLOGY

## 2021-06-03 NOTE — PROGRESS NOTES
Sleep Medicine Follow-Up Note    HPI: 70yo F with HERI being seen for a follow up visit  Treatment Summary: The patient had an HST in 2018 and has been on CPAP since then  NOVOSO HST from 2018: AHI 3% 46 7, 4% 41 2, blanca oxygen 76%  CPAP study 2021: BiPAP 24/20cm and PLMD was 37  Patient called and complained that the pressure was too much and it was lowered to 22/18cm  HPI:   Today, patient presents unaccompanied  She stated that the pressure is high  When it was turned down it got a little better  She really likes the mask that she used during the study, but it cracked by the bridge of the nose  She got a new one, but the pressure seems so high  When the pressure was reduced, the pressure feels a little better  She is sleeping a little better now, but her  is kept awake by her BiPAP noise and breathing  She will remove it after 4 hours of sleep  She denied kicking her  in bed  Treatment: BiPAP  -Pressure: 22/18cm   -Pressure intolerance: yes   -Aerophagia: no   -Air Hunger: no  -Interface: FFM  -Fit: okay, leaking and blowing off of her face  -Chin strap: no  -HCC: YME  -Patient's perceived outcome: bothersome, but feeling less tired in the afternoon       Compliance Card Data:    - Date Range: 21-21   - Settin/18cm   - Residual AHI: 5 6   - Vibratory Snore Index: n/a   - %  Night in Large Leak: 53 min   - Average usage days used: 4h 46m   - % Days used: 87%   - % Days with Usage > 4 hrs: 77%    Respiratory:  -Ongoing Snoring: not with mask on  -Mouth Breathing: yes  -Dry Mouth: yes  -Nocturnal Gasping: no    ROS:   Genitourinary none   Cardiology ankle/leg swelling   Gastrointestinal none   Neurology need to move extremities and difficulty with memory   Constitutional fatigue and weight change   Integumentary itching   Psychiatry none   Musculoskeletal none   Pulmonary snoring   ENT none   Endocrine frequent urination   Hematological none       Sleep Pattern:  -Position: side  -Bedtime: 1030-11pm  -Lights out: same time  -Environmental: No lights/TV  -Latency: can be right away or takes a long time  -Awakenings: 0-1  -Wake time: 630-7am  -Patient's estimate of Sleep Time: 5-6h    Daytime Symptoms:  -Upon Awakening: sometimes tired  -Daytime fatigue/sleepiness: less tired  -Naps: no  -Involuntary Dozing: sometimes if watching TV  Not when reading  -Cognitive Symptoms: short term memory is  "shot"  -Driving: Difficulty with sleepiness and driving:  no   -- Close calls related to sleepiness: no   -- Accidents related to sleepiness: no    Substance Use:  -Caffeine: 2 cups in the morning of coffee  -Alcohol: occasionally a glass of wine  -THC: no    --> Denies any significant medical changes since last visit  --> Supplemental Oxygen Use: denies    Questionnaire:  Sitting and reading: Slight chance of dozing  Watching TV: Moderate chance of dozing  Sitting, inactive in a public place (e g  a theatre or a meeting): Slight chance of dozing  As a passenger in a car for an hour without a break: Slight chance of dozing  Lying down to rest in the afternoon when circumstances permit: Would never doze  Sitting and talking to someone: Would never doze  Sitting quietly after a lunch without alcohol: Slight chance of dozing  In a car, while stopped for a few minutes in traffic: Slight chance of dozing  Total score: 7      PE:    /60   Ht 5' 2" (1 575 m)   Wt 77 2 kg (170 lb 3 2 oz)   BMI 31 13 kg/m²     General:  In NAD  Pul: Respirations: unlaboured  MS: No atrophy  Neuro: No resting tremor  Gait normal turning & station; unremarkable overall  Psych: Socially appropriate  Pleasant  No overt dysphoria  Assessment: The patient has been compliant with her BiPAP and her obstructive events are relatively well controlled with a residual AHI 5 6  she was changed from 24/20 to 22/18cm as she was unable to keep the mask on her face   She is a little better now with the lower pressure, but still has trouble with mask fit  She is deriving benefit from using her BiPAP as she is less tired than she was in the past and no longer snoring  Will not make any changes to her pressure today, but will have her meet with DME about masks and elevated HOB to 30 degrees  We spoke about other options such as Inspire, but her  had it and is not fully happy with it  She denied PLMs  Recommendations:    1) BiPAP at 22/18cm with FFM and speak to DME about headgear  Try to elevated HOB about 30 degrees  2) Safe driving reviewed  3) Follow-up 2 months  4) Call with any questions or concerns  All questions answered for the patient, who indicated understanding and agreed with the plan       Jeri Murphy MD  Psychiatry/ Sleep medicine

## 2021-06-03 NOTE — PATIENT INSTRUCTIONS
Recommendations:    1) BiPAP at 22/18cm with FFM and speak to DME about headgear  Try to elevated HOB about 30 degrees  2) Safe driving reviewed  3) Follow-up 2 months  4) Call with any questions or concerns 
child seat

## 2021-08-19 ENCOUNTER — OFFICE VISIT (OUTPATIENT)
Dept: SLEEP CENTER | Facility: CLINIC | Age: 71
End: 2021-08-19
Payer: MEDICARE

## 2021-08-19 VITALS
SYSTOLIC BLOOD PRESSURE: 124 MMHG | WEIGHT: 165.2 LBS | HEIGHT: 62 IN | BODY MASS INDEX: 30.4 KG/M2 | DIASTOLIC BLOOD PRESSURE: 82 MMHG

## 2021-08-19 DIAGNOSIS — G47.33 OSA (OBSTRUCTIVE SLEEP APNEA): Primary | ICD-10-CM

## 2021-08-19 PROCEDURE — 99213 OFFICE O/P EST LOW 20 MIN: CPT | Performed by: PSYCHIATRY & NEUROLOGY

## 2021-08-19 RX ORDER — BIOTIN 1 MG
TABLET ORAL
COMMUNITY

## 2021-08-19 NOTE — PROGRESS NOTES
Sleep Medicine Follow-Up Note    HPI: 70yo F with HERI being seen for a follow up visit  Treatment Summary: The patient had an HST in 2018 and has been on CPAP since then  NOVOSO HST from 2018: AHI 3% 46 7, 4% 41 2, blanca oxygen 76%  CPAP study 2021: BiPAP 24/20cm and PLMD was 37  Patient called and complained that the pressure was too much and it was lowered to 22/18cm  HPI:   Today, patient presents unaccompanied  She has stopped using her BiPAP due to the recall  She is not feeling worse, but her snoring has come back and it is keeping her  awake  She has registered her machine with [employer]  She wakes up once to void  When she is active during the day, she is not tired during the day  If she is idle and watching TV, she falls asleep right away  Compliance Card Data:    - Date Range: 21-21   - Settin/18cm   - Residual AHI: 9 5   - Vibratory Snore Index: n/a   - %  Night in Large Leak: 0 sec   - Average usage days used: 5h 21m   - % Days used: 100%   - % Days with Usage > 4 hrs: 100%    Respiratory:  -Ongoing Snoring: yes  -Mouth Breathing: yes  -Dry Mouth: yes  -Nocturnal Gasping: no    ROS:   Genitourinary hot flashes at night   Cardiology none   Gastrointestinal none   Neurology forgetfulness, poor concentration or confusion,  and difficulty with memory   Constitutional excessive sweating at night   Integumentary itching   Psychiatry none   Musculoskeletal none   Pulmonary snoring   ENT none   Endocrine frequent urination   Hematological none       Sleep Pattern:  -Position: side  -Bedtime: 1030-11pm  -Lights out: same time  -Environmental: No lights/TV  -Latency: can be right away or takes a long time  -Awakenings: 0-1  -Wake time: 630-7am  -Patient's estimate of Sleep Time: 5-6h    Daytime Symptoms:  -Upon Awakening: not always refreshed   -Daytime fatigue/sleepiness: tired if idle and dozes off  Feels ok when she is active    -Naps: no  -Involuntary Dozing: yes  -Cognitive Symptoms: short term memory is  unchanged  -Driving: Difficulty with sleepiness and driving:  no   -- Close calls related to sleepiness: no   -- Accidents related to sleepiness: no    Substance Use:  -Caffeine: 2 cups in the morning of coffee  -Alcohol: occasionally a glass of wine  -THC: no    --> Denies any significant medical changes since last visit  --> Supplemental Oxygen Use: denies    Questionnaire:  Sitting and reading: Moderate chance of dozing  Watching TV: Moderate chance of dozing  Sitting, inactive in a public place (e g  a theatre or a meeting): Would never doze  As a passenger in a car for an hour without a break: Slight chance of dozing  Lying down to rest in the afternoon when circumstances permit: Slight chance of dozing  Sitting and talking to someone: Would never doze  Sitting quietly after a lunch without alcohol: Would never doze  In a car, while stopped for a few minutes in traffic: Would never doze  Total score: 6      PE:    /82   Ht 5' 2" (1 575 m)   Wt 74 9 kg (165 lb 3 2 oz)   BMI 30 22 kg/m²     General:  In NAD  Pul: Respirations: unlaboured  MS: No atrophy  Neuro: No resting tremor  Gait normal turning & station; unremarkable overall  Psych: Socially appropriate  Pleasant  No overt dysphoria  Assessment: The patient has been compliant with her BiPAP, but her HERI is not controlled with AHI of 9 5  she has stopped using her BiPAP due to the recall and is awaiting a new one or a fixed one from [employer]  Her snoring has come back full force and also she is dozing and tired when not active  She denied sleepiness while driving  Her machine is only a few months old and has a much lower likelihood of the foam being broken down that an older device  If she wishes she may restart using it, but she doesn't want a higher pressure  She can use it with a wedge pillow         Recommendations:    1) she wishes to wait until she gets a new machine from [employer]  She can try to use a wedge pillow to elevated HOB or try nasal silicone stents for snoring  2) Safe driving reviewed  3) Follow-up 9 months  4) Call with any questions or concerns  All questions answered for the patient, who indicated understanding and agreed with the plan       Misty Ragland MD  Psychiatry/ Sleep medicine

## 2021-08-19 NOTE — PATIENT INSTRUCTIONS
Recommendations:    1) she wishes to wait until she gets a new machine from [employer]  She can try to use a wedge pillow to elevated HOB or try nasal silicone stents for snoring  2) Safe driving reviewed  3) Follow-up 9 months  4) Call with any questions or concerns

## 2022-01-26 DIAGNOSIS — G47.33 OSA (OBSTRUCTIVE SLEEP APNEA): Primary | ICD-10-CM

## 2022-01-26 NOTE — PROGRESS NOTES
Mrs Meena Bains came to the visit today with her  James Peng  We discussed her frustrations with her BIPAP and she was wondering if any adjustments could be made  We discussed switching to an auto-titrating BIPAP  We will follow up formally in a new visit in a few months when viviane returns to the office

## 2022-01-27 ENCOUNTER — TELEPHONE (OUTPATIENT)
Dept: SLEEP CENTER | Facility: CLINIC | Age: 72
End: 2022-01-27

## 2022-01-27 NOTE — TELEPHONE ENCOUNTER
Progress Notes  Stephen Henry DO (Physician) [facility]  Mrs Concetta Araiza came to the visit today with her  Jimmy Diaz  We discussed her frustrations with her BIPAP and she was wondering if any adjustments could be made  We discussed switching to an auto-titrating BIPAP  We will follow up formally in a new visit in a few months when viviane returns to the office  Rx for pressure change emailed to [place]

## 2022-04-25 ENCOUNTER — OFFICE VISIT (OUTPATIENT)
Dept: SLEEP CENTER | Facility: CLINIC | Age: 72
End: 2022-04-25
Payer: MEDICARE

## 2022-04-25 ENCOUNTER — TELEPHONE (OUTPATIENT)
Dept: SLEEP CENTER | Facility: CLINIC | Age: 72
End: 2022-04-25

## 2022-04-25 VITALS
HEART RATE: 66 BPM | WEIGHT: 170 LBS | HEIGHT: 62 IN | BODY MASS INDEX: 31.28 KG/M2 | SYSTOLIC BLOOD PRESSURE: 138 MMHG | DIASTOLIC BLOOD PRESSURE: 70 MMHG

## 2022-04-25 DIAGNOSIS — G47.61 PERIODIC LIMB MOVEMENTS OF SLEEP: ICD-10-CM

## 2022-04-25 DIAGNOSIS — G47.33 OSA (OBSTRUCTIVE SLEEP APNEA): Primary | ICD-10-CM

## 2022-04-25 PROCEDURE — 99214 OFFICE O/P EST MOD 30 MIN: CPT | Performed by: INTERNAL MEDICINE

## 2022-04-25 NOTE — LETTER
April 25, 2022     Maty Beaulieu, [address] [address]    Patient: Obed Cool   YOB: 1950   Date of Visit: 4/25/2022       Dear Dr Shasta Coto: Thank you for referring Obed Cool to me for evaluation  Below are my notes for this consultation  If you have questions, please do not hesitate to call me  I look forward to following your patient along with you  Sincerely,        Neda Cantu DO        CC: No Recipients  Neda Cantu DO  4/25/2022 10:13 PM  Sign when Signing Visit  Progress Note - Sleep Medicine  Obed Cool 80 y o  female MRN: [id number]    Assessment/Plan  80 y o  F with PMHx of breast cancer who comes in for management of HERI on BIPAP  1   Severe HERI (SHERWIN - 47) on BIPAP 22/18 - good compliance and residual AHI down to 10  However, no significant improvement on BIPAP       Despite compliance, she struggles significantly with BIPAP  She does not feel that it has been helpful with her sleepiness  -  Switch to autoBIPAP to see if there is clinical improvement  -  New supply order placed  -  I also discussed in depth the risk of leaving sleep apnea untreated including hypertension, heart failure, arrhythmia, MI and stroke  -  The patient is agreeable to undergo testing and treatment of obstructive sleep apnea  She understands that pitfalls she may encounter along the way and is willing to attempt CPAP treatment  2  Periodic limb movement in sleep - she does not have symptoms of RLS  -  Treat HERI as above       -  If daytime sleepiness persists after the treatment of HERI, will consider treatment with Neurontin, Pregabalin, Requip or Mirapex     ______________________________________________________________________    HPI:    Obed Cool presents today for follow-up for her HERI  She has been using her BIPAP but has not had significant improvement on BiPAP  She struggles keeping it on    When she gets to 4 hrs she will take her BIPAP off and go to sleep  She denies does not feel improvement in daytime sleepiness  She notes mask leak  She still naps during the afternoon and does feel better with the nap  She does not have restless leg symptoms  She does not struggle to fall asleep            Review of Systems:  Review of Systems  Genitourinary none   Cardiology ankle/leg swelling   Gastrointestinal none   Neurology forgetfulness, poor concentration or confusion,  and difficulty with memory   Constitutional none   Integumentary itching   Psychiatry none   Musculoskeletal muscle aches, back pain and legs twitching/jerking   Pulmonary snoring   ENT none   Endocrine none   Hematological none         Social history updates:  Social History     Tobacco Use   Smoking Status Never Smoker   Smokeless Tobacco Never Used     Social History     Socioeconomic History    Marital status: /Civil Union     Spouse name: Not on file    Number of children: Not on file    Years of education: Not on file    Highest education level: Not on file   Occupational History    Not on file   Tobacco Use    Smoking status: Never Smoker    Smokeless tobacco: Never Used   Substance and Sexual Activity    Alcohol use: Not on file    Drug use: Not on file    Sexual activity: Not on file   Other Topics Concern    Not on file   Social History Narrative    Not on file     Social Determinants of Health     Financial Resource Strain: Not on file   Food Insecurity: Not on file   Transportation Needs: Not on file   Physical Activity: Not on file   Stress: Not on file   Social Connections: Not on file   Intimate Partner Violence: Not on file   Housing Stability: Not on file       PhysicalExamination:  Vitals:   /70 (BP Location: Left arm, Patient Position: Sitting, Cuff Size: Adult)   Pulse 66   Ht 5' 2" (1 575 m)   Wt 77 1 kg (170 lb)   BMI 31 09 kg/m²   General: Pleasant, Awake alert and oriented x 3, conversant without conversational dyspnea, NAD, normal affect  HEENT:  PERRL, Sclera noninjected, nonicteric OU, Nares patent,  no craniofacial abnormalities, Mucous membranes, moist, no oral lesions, normal dentition, Mallampati class 3  NECK: Trachea midline, no accessory muscle use, no stridor, no cervical or supraclavicular adenopathy, JVP not elevated  CARDIAC: Reg, single s1/S2, no m/r/g  PULM: CTA bilaterally no wheezing, rhonchi or rales  ABD: Normoactive bowel sounds, soft nontender, nondistended, no rebound, no rigidity, no guarding  EXT: No cyanosis, no clubbing, no edema, normal capillary refill  NEURO: no focal neurologic deficits, AAOx3, moving all extremities appropriately    Diagnostic Data:  Labs: I personally reviewed the most recent laboratory data pertinent to today's visit  I have personally reviewed pertinent lab results  Lab Results   Component Value Date    WBC 3 70 (L) 2020    HGB 13 9 2020    HCT 44 5 2020    MCV 95 2020     2020     Lab Results   Component Value Date    CALCIUM 9 0 2020    K 4 3 2020    CO2 31 2020     2020    BUN 16 2020    CREATININE 0 77 2020     No results found for: IGE  Lab Results   Component Value Date    ALT 27 2020    AST 23 2020    ALKPHOS 65 2020         Sleep studies:  HST in 2018 and has been on CPAP since then  [facility] HST from 2018: AHI 3% 46 7, 4% 41 2, blanca oxygen 76%  Titration:  BiPAP 24/20cm and PLMD was 37      Compliance Data:  3/26/22 - 22                                  Type of CPAP:  BIPAP 22/18                                   Percent usage: 97%                                   Average time used: 5 hrs 17 mins                                  Time in large leak: 0 secs                                   Residual AHI: 10 2                                                - Date Range: 21-21              - Settin/18cm              - Residual AHI: 9 5              - Vibratory Snore Index: n/a              - %  Night in Large Leak: 0 sec              - Average usage days used: 5h 21m              - % Days used: 100%              - % Days with Usage > 4 hrs: 100%    Darolyn Dancer, DO

## 2022-04-25 NOTE — PROGRESS NOTES
Review of Systems      Genitourinary none   Cardiology ankle/leg swelling   Gastrointestinal none   Neurology forgetfulness, poor concentration or confusion,  and difficulty with memory   Constitutional none   Integumentary itching   Psychiatry none   Musculoskeletal muscle aches, back pain and legs twitching/jerking   Pulmonary snoring   ENT none   Endocrine none   Hematological none

## 2022-04-26 ENCOUNTER — TELEPHONE (OUTPATIENT)
Dept: SLEEP CENTER | Facility: CLINIC | Age: 72
End: 2022-04-26

## 2022-04-26 NOTE — TELEPHONE ENCOUNTER
DME Pressure change and [facility] [facility] [place] [facility]

## 2022-04-26 NOTE — PROGRESS NOTES
Progress Note - Sleep Medicine  Denisha Varner 80 y o  female MRN: [id number]    Assessment/Plan  80 y o  F with PMHx of breast cancer who comes in for management of HERI on BIPAP  1   Severe HERI (SHERWIN - 47) on BIPAP 22/18 - good compliance and residual AHI down to 10  However, no significant improvement on BIPAP       Despite compliance, she struggles significantly with BIPAP  She does not feel that it has been helpful with her sleepiness  -  Switch to autoBIPAP to see if there is clinical improvement  -  New supply order placed  -  I also discussed in depth the risk of leaving sleep apnea untreated including hypertension, heart failure, arrhythmia, MI and stroke  -  The patient is agreeable to undergo testing and treatment of obstructive sleep apnea  She understands that pitfalls she may encounter along the way and is willing to attempt CPAP treatment  2  Periodic limb movement in sleep - she does not have symptoms of RLS  -  Treat HERI as above       -  If daytime sleepiness persists after the treatment of HERI, will consider treatment with Neurontin, Pregabalin, Requip or Mirapex     ______________________________________________________________________    HPI:    Denisha Varner presents today for follow-up for her HERI  She has been using her BIPAP but has not had significant improvement on BiPAP  She struggles keeping it on  When she gets to 4 hrs she will take her BIPAP off and go to sleep  She denies does not feel improvement in daytime sleepiness  She notes mask leak  She still naps during the afternoon and does feel better with the nap  She does not have restless leg symptoms  She does not struggle to fall asleep            Review of Systems:  Review of Systems  Genitourinary none   Cardiology ankle/leg swelling   Gastrointestinal none   Neurology forgetfulness, poor concentration or confusion,  and difficulty with memory   Constitutional none   Integumentary itching Psychiatry none   Musculoskeletal muscle aches, back pain and legs twitching/jerking   Pulmonary snoring   ENT none   Endocrine none   Hematological none         Social history updates:  Social History     Tobacco Use   Smoking Status Never Smoker   Smokeless Tobacco Never Used     Social History     Socioeconomic History    Marital status: /Civil Union     Spouse name: Not on file    Number of children: Not on file    Years of education: Not on file    Highest education level: Not on file   Occupational History    Not on file   Tobacco Use    Smoking status: Never Smoker    Smokeless tobacco: Never Used   Substance and Sexual Activity    Alcohol use: Not on file    Drug use: Not on file    Sexual activity: Not on file   Other Topics Concern    Not on file   Social History Narrative    Not on file     Social Determinants of Health     Financial Resource Strain: Not on file   Food Insecurity: Not on file   Transportation Needs: Not on file   Physical Activity: Not on file   Stress: Not on file   Social Connections: Not on file   Intimate Partner Violence: Not on file   Housing Stability: Not on file       PhysicalExamination:  Vitals:   /70 (BP Location: Left arm, Patient Position: Sitting, Cuff Size: Adult)   Pulse 66   Ht 5' 2" (1 575 m)   Wt 77 1 kg (170 lb)   BMI 31 09 kg/m²   General: Pleasant, Awake alert and oriented x 3, conversant without conversational dyspnea, NAD, normal affect  HEENT:  PERRL, Sclera noninjected, nonicteric OU, Nares patent,  no craniofacial abnormalities, Mucous membranes, moist, no oral lesions, normal dentition, Mallampati class 3  NECK: Trachea midline, no accessory muscle use, no stridor, no cervical or supraclavicular adenopathy, JVP not elevated  CARDIAC: Reg, single s1/S2, no m/r/g  PULM: CTA bilaterally no wheezing, rhonchi or rales  ABD: Normoactive bowel sounds, soft nontender, nondistended, no rebound, no rigidity, no guarding  EXT: No cyanosis, no clubbing, no edema, normal capillary refill  NEURO: no focal neurologic deficits, AAOx3, moving all extremities appropriately    Diagnostic Data:  Labs: I personally reviewed the most recent laboratory data pertinent to today's visit  I have personally reviewed pertinent lab results  Lab Results   Component Value Date    WBC 3 70 (L) 2020    HGB 13 9 2020    HCT 44 5 2020    MCV 95 2020     2020     Lab Results   Component Value Date    CALCIUM 9 0 2020    K 4 3 2020    CO2 31 2020     2020    BUN 16 2020    CREATININE 0 77 2020     No results found for: IGE  Lab Results   Component Value Date    ALT 27 2020    AST 23 2020    ALKPHOS 65 2020         Sleep studies:  HST in 2018 and has been on CPAP since then  NOVOSO HST from 2018: AHI 3% 46 7, 4% 41 2, blanca oxygen 76%  Titration:  BiPAP 24/20cm and PLMD was 37      Compliance Data:  3/26/22 - 22                                  Type of CPAP:  BIPAP 22/18                                   Percent usage: 97%                                   Average time used: 5 hrs 17 mins                                  Time in large leak: 0 secs                                   Residual AHI: 10 2                                                - Date Range: 21-21              - Settin/18cm              - Residual AHI: 9 5              - Vibratory Snore Index: n/a              - %  Night in Large Leak: 0 sec              - Average usage days used: 5h 21m              - % Days used: 100%              - % Days with Usage > 4 hrs: 100%    Charu Meyer DO

## 2022-11-09 ENCOUNTER — OFFICE VISIT (OUTPATIENT)
Dept: SLEEP CENTER | Facility: CLINIC | Age: 72
End: 2022-11-09

## 2022-11-09 VITALS
DIASTOLIC BLOOD PRESSURE: 65 MMHG | SYSTOLIC BLOOD PRESSURE: 132 MMHG | BODY MASS INDEX: 30.55 KG/M2 | HEIGHT: 62 IN | HEART RATE: 69 BPM | WEIGHT: 166 LBS

## 2022-11-09 DIAGNOSIS — G47.33 OSA (OBSTRUCTIVE SLEEP APNEA): Primary | ICD-10-CM

## 2022-11-09 DIAGNOSIS — G47.61 PERIODIC LIMB MOVEMENTS OF SLEEP: ICD-10-CM

## 2022-11-09 NOTE — PROGRESS NOTES
Review of Systems      Genitourinary none   Cardiology none   Gastrointestinal none   Neurology none   Constitutional none   Integumentary none   Psychiatry none   Musculoskeletal none   Pulmonary shortness of breath with activity and snoring   ENT none   Endocrine none   Hematological none

## 2022-11-09 NOTE — LETTER
November 12, 2022     Deandre Sargent, [address] [place] [address]    Patient: Ahmet Carrasco   YOB: 1950   Date of Visit: 11/9/2022       Dear Dr Tenisha Garza: Thank you for referring Ahmet Carrasco to me for evaluation  Below are my notes for this consultation  If you have questions, please do not hesitate to call me  I look forward to following your patient along with you  Sincerely,        Ángel Babin DO        CC: No Recipients  Ángel Babin DO  11/12/2022  8:55 AM  Sign when Signing Visit  Progress Note - Sleep Medicine  Ahmet Carrasco 71 y o  female MRN: [id number]       Impression & Plan:   71 y o  F with PMHx of breast cancer who comes in for management of HERI on BIPAP  1   Severe HERI (SHERWIN - 47) on autoBIPAP with mild improvement in AHI down from 10 to 8 5 but still states that mask leak tends to be her biggest issue  She also has suboptimal compliance and will take the mask off after 4 hrs  She does not feel that BIPAP has been helpful with her sleepiness         -  Due to mask leak becoming a persistent problem, we will cap the BIPAP pressure at max IPAP of 21  We will accept more respiratory events as a result but perhaps she will be able to sleep through the night for extended periods      -  We discussed mask change that may possible help as well      -  We also did discuss combined BIPAP therapy with positional therapy or inspire therapy  She prefers to avoid going through surgery given her husbands initial frustration with Adriane Schreiber  She will contemplate it as a possibility          -  I also discussed in depth the risk of leaving sleep apnea untreated including hypertension, heart failure, arrhythmia, MI and stroke      2  Periodic limb movement in sleep - she does not have symptoms of RLS           -  Treat HERI as above       -  If she is still struggling with sleep quality, we can consider gabapentin at next visit to help PLMs and sleep quality  ______________________________________________________________________    HPI:    Naun Hawkins presents today for follow-up for her HERI  She states that since last visit, the change to [facility] has on some nights, been more helpful for her sleep quality  However, in the middle of the night she will note significant leaking from the mask  She is worried that it is interrupting her 's sleep quality and she too will get frequent interruptions  However, on the nights that she does sleep through, she feels better  However, these are infrequent  She notes morning headache and dry mouth  She also will nap during the day because she is feeling unrested  She also ntoes nasal congestion and air leaks  She will take the mask off at night and then she is snoring significantly            Review of Systems:  Review of Systems  Genitourinary none   Cardiology none   Gastrointestinal none   Neurology none   Constitutional none   Integumentary none   Psychiatry none   Musculoskeletal none   Pulmonary shortness of breath with activity and snoring   ENT none   Endocrine none   Hematological none       Social history updates:  Social History     Tobacco Use   Smoking Status Never Smoker   Smokeless Tobacco Never Used     Social History     Socioeconomic History   • Marital status: /Civil Union     Spouse name: Not on file   • Number of children: Not on file   • Years of education: Not on file   • Highest education level: Not on file   Occupational History   • Not on file   Tobacco Use   • Smoking status: Never Smoker   • Smokeless tobacco: Never Used   Substance and Sexual Activity   • Alcohol use: Not on file   • Drug use: Not on file   • Sexual activity: Not on file   Other Topics Concern   • Not on file   Social History Narrative   • Not on file     Social Determinants of Health     Financial Resource Strain: Not on file   Food Insecurity: Not on file   Transportation Needs: Not on file   Physical Activity: Not on file   Stress: Not on file   Social Connections: Not on file   Intimate Partner Violence: Not on file   Housing Stability: Not on file       PhysicalExamination:  Vitals:   /65 (BP Location: Left arm, Patient Position: Sitting, Cuff Size: Large)   Pulse 69   Ht 5' 2" (1 575 m)   Wt 75 3 kg (166 lb)   BMI 30 36 kg/m²   General: Pleasant, Awake alert and oriented x 3, conversant without conversational dyspnea, NAD, normal affect  HEENT:  PERRL, Sclera noninjected, nonicteric OU, Nares patent,  no craniofacial abnormalities, Mucous membranes, moist, no oral lesions, normal dentition  NECK: Trachea midline, no accessory muscle use, no stridor, no cervical or supraclavicular adenopathy, JVP not elevated  CARDIAC: Reg, single s1/S2, no m/r/g  PULM: CTA bilaterally no wheezing, rhonchi or rales  ABD: Normoactive bowel sounds, soft nontender, nondistended, no rebound, no rigidity, no guarding  EXT: No cyanosis, no clubbing, no edema, normal capillary refill  NEURO: no focal neurologic deficits, AAOx3, moving all extremities appropriately      Diagnostic Data:  Labs: I personally reviewed the most recent laboratory data pertinent to today's visit  I have personally reviewed pertinent lab results  Lab Results   Component Value Date    WBC 3 70 (L) 07/20/2020    HGB 13 9 07/20/2020    HCT 44 5 07/20/2020    MCV 95 07/20/2020     07/20/2020     Lab Results   Component Value Date    CALCIUM 9 0 07/20/2020    K 4 3 07/20/2020    CO2 31 07/20/2020     07/20/2020    BUN 16 07/20/2020    CREATININE 0 77 07/20/2020     No results found for: IGE  Lab Results   Component Value Date    ALT 27 07/20/2020    AST 23 07/20/2020    ALKPHOS 65 07/20/2020       Sleep studies:  HST in 2018 and has been on CPAP since then  [facility] HST from 5/2018: AHI 3% 46 7, 4% 41 2, blanca oxygen 76%    Titration:  BiPAP 24/20cm and PLMD was 37      New Compliance Data:  10/8/22 - 11/6/22 Type of CPAP:  AutoBIPAP min EPAP 12, PSV 4-6, max IPAP 25                                       Percent usage: 93% > 4 hrs 63%                                   Average time used: 4 hrs 53 mins                                  Time in large leak: 0 secs, mask fit 97%                                   Residual AHI: 8 5 (SAMI - 1)    Compliance Data:  3/26/22 - 22                                  Type of CPAP:  BIPAP 22/                                   Percent usage: 97%                                   Average time used: 5 hrs 17 mins                                  Time in large leak: 0 secs                                   Residual AHI: 10 2                                                 - Date Range: 21-21              - Settin/18cm              - Residual AHI: 9 5              - Vibratory Snore Index: n/a              - %  Night in Large Leak: 0 sec              - Average usage days used: 5h 21m              - % Days used: 100%              - % Days with Usage > 4 hrs: 100%         Lion Cochran DO

## 2022-11-10 ENCOUNTER — TELEPHONE (OUTPATIENT)
Dept: SLEEP CENTER | Facility: CLINIC | Age: 72
End: 2022-11-10

## 2022-11-10 LAB
DME PARACHUTE DELIVERY DATE REQUESTED: NORMAL
DME PARACHUTE ITEM DESCRIPTION: NORMAL
DME PARACHUTE ORDER STATUS: NORMAL
DME PARACHUTE SUPPLIER NAME: NORMAL
DME PARACHUTE SUPPLIER PHONE: NORMAL

## 2022-11-12 NOTE — PROGRESS NOTES
Progress Note - Sleep Medicine  Tal Gutierrez 71 y o  female MRN: [id number]       Impression & Plan:   71 y o  F with PMHx of breast cancer who comes in for management of HERI on BIPAP  1   Severe HERI (SHERWIN - 47) on autoBIPAP with mild improvement in AHI down from 10 to 8 5 but still states that mask leak tends to be her biggest issue  She also has suboptimal compliance and will take the mask off after 4 hrs  She does not feel that BIPAP has been helpful with her sleepiness         -  Due to mask leak becoming a persistent problem, we will cap the BIPAP pressure at max IPAP of 21  We will accept more respiratory events as a result but perhaps she will be able to sleep through the night for extended periods      -  We discussed mask change that may possible help as well      -  We also did discuss combined BIPAP therapy with positional therapy or inspire therapy  She prefers to avoid going through surgery given her husbands initial frustration with Reinaldo Fernández  She will contemplate it as a possibility          -  I also discussed in depth the risk of leaving sleep apnea untreated including hypertension, heart failure, arrhythmia, MI and stroke      2  Periodic limb movement in sleep - she does not have symptoms of RLS  -  Treat HERI as above       -  If she is still struggling with sleep quality, we can consider gabapentin at next visit to help PLMs and sleep quality  ______________________________________________________________________    HPI:    Tal Gutierrez presents today for follow-up for her HERI  She states that since last visit, the change to [facility] has on some nights, been more helpful for her sleep quality  However, in the middle of the night she will note significant leaking from the mask  She is worried that it is interrupting her 's sleep quality and she too will get frequent interruptions  However, on the nights that she does sleep through, she feels better    However, these are infrequent  She notes morning headache and dry mouth  She also will nap during the day because she is feeling unrested  She also ntoes nasal congestion and air leaks  She will take the mask off at night and then she is snoring significantly            Review of Systems:  Review of Systems  Genitourinary none   Cardiology none   Gastrointestinal none   Neurology none   Constitutional none   Integumentary none   Psychiatry none   Musculoskeletal none   Pulmonary shortness of breath with activity and snoring   ENT none   Endocrine none   Hematological none       Social history updates:  Social History     Tobacco Use   Smoking Status Never Smoker   Smokeless Tobacco Never Used     Social History     Socioeconomic History   • Marital status: /Civil Union     Spouse name: Not on file   • Number of children: Not on file   • Years of education: Not on file   • Highest education level: Not on file   Occupational History   • Not on file   Tobacco Use   • Smoking status: Never Smoker   • Smokeless tobacco: Never Used   Substance and Sexual Activity   • Alcohol use: Not on file   • Drug use: Not on file   • Sexual activity: Not on file   Other Topics Concern   • Not on file   Social History Narrative   • Not on file     Social Determinants of Health     Financial Resource Strain: Not on file   Food Insecurity: Not on file   Transportation Needs: Not on file   Physical Activity: Not on file   Stress: Not on file   Social Connections: Not on file   Intimate Partner Violence: Not on file   Housing Stability: Not on file       PhysicalExamination:  Vitals:   /65 (BP Location: Left arm, Patient Position: Sitting, Cuff Size: Large)   Pulse 69   Ht 5' 2" (1 575 m)   Wt 75 3 kg (166 lb)   BMI 30 36 kg/m²   General: Pleasant, Awake alert and oriented x 3, conversant without conversational dyspnea, NAD, normal affect  HEENT:  PERRL, Sclera noninjected, nonicteric OU, Nares patent,  no craniofacial abnormalities, Mucous membranes, moist, no oral lesions, normal dentition  NECK: Trachea midline, no accessory muscle use, no stridor, no cervical or supraclavicular adenopathy, JVP not elevated  CARDIAC: Reg, single s1/S2, no m/r/g  PULM: CTA bilaterally no wheezing, rhonchi or rales  ABD: Normoactive bowel sounds, soft nontender, nondistended, no rebound, no rigidity, no guarding  EXT: No cyanosis, no clubbing, no edema, normal capillary refill  NEURO: no focal neurologic deficits, AAOx3, moving all extremities appropriately      Diagnostic Data:  Labs: I personally reviewed the most recent laboratory data pertinent to today's visit  I have personally reviewed pertinent lab results  Lab Results   Component Value Date    WBC 3 70 (L) 07/20/2020    HGB 13 9 07/20/2020    HCT 44 5 07/20/2020    MCV 95 07/20/2020     07/20/2020     Lab Results   Component Value Date    CALCIUM 9 0 07/20/2020    K 4 3 07/20/2020    CO2 31 07/20/2020     07/20/2020    BUN 16 07/20/2020    CREATININE 0 77 07/20/2020     No results found for: IGE  Lab Results   Component Value Date    ALT 27 07/20/2020    AST 23 07/20/2020    ALKPHOS 65 07/20/2020       Sleep studies:  HST in 2018 and has been on CPAP since then  [facility] HST from 5/2018: AHI 3% 46 7, 4% 41 2, blanca oxygen 76%  Titration:  BiPAP 24/20cm and PLMD was 37      New Compliance Data:  10/8/22 - 11/6/22                                  Type of CPAP:  AutoBIPAP min EPAP 12, PSV 4-6, max IPAP 25                                       Percent usage: 93% > 4 hrs 63%                                   Average time used: 4 hrs 53 mins                                  Time in large leak: 0 secs, mask fit 97%                                   Residual AHI: 8 5 (SAMI - 1)    Compliance Data:  3/26/22 - 4/24/22                                  Type of CPAP:  BIPAP 22/18                                   Percent usage: 97%                                   Average time used: 5 hrs 17 mins Time in large leak: 0 secs                                   Residual AHI: 10 2                                                 - Date Range: 21-21              - Settin/18cm              - Residual AHI: 9 5              - Vibratory Snore Index: n/a              - %  Night in Large Leak: 0 sec              - Average usage days used: 5h 21m              - % Days used: 100%              - % Days with Usage > 4 hrs: 100%         Gabriela Altamirano DO

## 2022-11-30 ENCOUNTER — TELEPHONE (OUTPATIENT)
Dept: SLEEP CENTER | Facility: CLINIC | Age: 72
End: 2022-11-30

## 2022-11-30 ENCOUNTER — PATIENT MESSAGE (OUTPATIENT)
Dept: SLEEP CENTER | Facility: CLINIC | Age: 72
End: 2022-11-30

## 2022-11-30 NOTE — TELEPHONE ENCOUNTER
Replied to patient's MyChart message  Advised change was completed today and can take 24-48 hours to change on her machine

## 2022-11-30 NOTE — TELEPHONE ENCOUNTER
Patient's  sent Break[facility] message stating he does not think the pressure change was ever completed on his wife's machine  Rx for pressure change was sent via [place] on 11/10/22  [facility] or [facility], can you confirm if pressure change completed?   Per script:  Machine Type: BiPAP   Machine Type: Auto   Max IPAP (cm H2O 4-25) 21   Minimum EPAP (cm H2O) 12   Min Pressure Support (cm H2O) 4

## 2022-12-28 ENCOUNTER — TELEPHONE (OUTPATIENT)
Dept: SLEEP CENTER | Facility: CLINIC | Age: 72
End: 2022-12-28

## 2022-12-28 DIAGNOSIS — G47.33 OSA (OBSTRUCTIVE SLEEP APNEA): Primary | ICD-10-CM

## 2022-12-28 NOTE — TELEPHONE ENCOUNTER
Pressure changed to 21/12cm PS 4cm I have reached out to Dr Pinky Stanton b/c the machine is a Eileen and we need 2 pressure supports on the rx  I called the pt   And LVM telling her the change was made and allow 24 hr

## 2022-12-29 LAB
DME PARACHUTE DELIVERY DATE REQUESTED: NORMAL
DME PARACHUTE DELIVERY NOTE: NORMAL
DME PARACHUTE ITEM DESCRIPTION: NORMAL
DME PARACHUTE ORDER STATUS: NORMAL
DME PARACHUTE SUPPLIER NAME: NORMAL
DME PARACHUTE SUPPLIER PHONE: NORMAL

## 2023-02-01 LAB
DME PARACHUTE DELIVERY DATE ACTUAL: NORMAL
DME PARACHUTE DELIVERY DATE REQUESTED: NORMAL
DME PARACHUTE DELIVERY NOTE: NORMAL
DME PARACHUTE ITEM DESCRIPTION: NORMAL
DME PARACHUTE ORDER STATUS: NORMAL
DME PARACHUTE SUPPLIER NAME: NORMAL
DME PARACHUTE SUPPLIER PHONE: NORMAL

## 2025-03-28 ENCOUNTER — APPOINTMENT (OUTPATIENT)
Dept: LAB | Facility: CLINIC | Age: 75
End: 2025-03-28
Payer: MEDICARE

## 2025-03-28 DIAGNOSIS — E78.5 HYPERLIPIDEMIA, UNSPECIFIED HYPERLIPIDEMIA TYPE: ICD-10-CM

## 2025-03-28 DIAGNOSIS — Z00.00 ROUTINE GENERAL MEDICAL EXAMINATION AT A HEALTH CARE FACILITY: ICD-10-CM

## 2025-03-28 LAB
ALBUMIN SERPL BCG-MCNC: 4.5 G/DL (ref 3.5–5)
ALP SERPL-CCNC: 49 U/L (ref 34–104)
ALT SERPL W P-5'-P-CCNC: 15 U/L (ref 7–52)
ANION GAP SERPL CALCULATED.3IONS-SCNC: 6 MMOL/L (ref 4–13)
AST SERPL W P-5'-P-CCNC: 19 U/L (ref 13–39)
BASOPHILS # BLD AUTO: 0.03 THOUSANDS/ÂΜL (ref 0–0.1)
BASOPHILS NFR BLD AUTO: 1 % (ref 0–1)
BILIRUB SERPL-MCNC: 0.54 MG/DL (ref 0.2–1)
BUN SERPL-MCNC: 19 MG/DL (ref 5–25)
CALCIUM SERPL-MCNC: 9.8 MG/DL (ref 8.4–10.2)
CHLORIDE SERPL-SCNC: 107 MMOL/L (ref 96–108)
CHOLEST SERPL-MCNC: 225 MG/DL (ref ?–200)
CO2 SERPL-SCNC: 29 MMOL/L (ref 21–32)
CREAT SERPL-MCNC: 1 MG/DL (ref 0.6–1.3)
EOSINOPHIL # BLD AUTO: 0.14 THOUSAND/ÂΜL (ref 0–0.61)
EOSINOPHIL NFR BLD AUTO: 4 % (ref 0–6)
ERYTHROCYTE [DISTWIDTH] IN BLOOD BY AUTOMATED COUNT: 13.2 % (ref 11.6–15.1)
GFR SERPL CREATININE-BSD FRML MDRD: 56 ML/MIN/1.73SQ M
GLUCOSE P FAST SERPL-MCNC: 101 MG/DL (ref 65–99)
HCT VFR BLD AUTO: 46 % (ref 34.8–46.1)
HDLC SERPL-MCNC: 78 MG/DL
HGB BLD-MCNC: 14.7 G/DL (ref 11.5–15.4)
IMM GRANULOCYTES # BLD AUTO: 0.02 THOUSAND/UL (ref 0–0.2)
IMM GRANULOCYTES NFR BLD AUTO: 1 % (ref 0–2)
LDLC SERPL CALC-MCNC: 131 MG/DL (ref 0–100)
LYMPHOCYTES # BLD AUTO: 1.43 THOUSANDS/ÂΜL (ref 0.6–4.47)
LYMPHOCYTES NFR BLD AUTO: 36 % (ref 14–44)
MCH RBC QN AUTO: 29.9 PG (ref 26.8–34.3)
MCHC RBC AUTO-ENTMCNC: 32 G/DL (ref 31.4–37.4)
MCV RBC AUTO: 94 FL (ref 82–98)
MONOCYTES # BLD AUTO: 0.58 THOUSAND/ÂΜL (ref 0.17–1.22)
MONOCYTES NFR BLD AUTO: 15 % (ref 4–12)
NEUTROPHILS # BLD AUTO: 1.77 THOUSANDS/ÂΜL (ref 1.85–7.62)
NEUTS SEG NFR BLD AUTO: 43 % (ref 43–75)
NONHDLC SERPL-MCNC: 147 MG/DL
NRBC BLD AUTO-RTO: 0 /100 WBCS
PLATELET # BLD AUTO: 208 THOUSANDS/UL (ref 149–390)
PMV BLD AUTO: 11.7 FL (ref 8.9–12.7)
POTASSIUM SERPL-SCNC: 4.4 MMOL/L (ref 3.5–5.3)
PROT SERPL-MCNC: 7 G/DL (ref 6.4–8.4)
RBC # BLD AUTO: 4.91 MILLION/UL (ref 3.81–5.12)
SODIUM SERPL-SCNC: 142 MMOL/L (ref 135–147)
TRIGL SERPL-MCNC: 79 MG/DL (ref ?–150)
TSH SERPL DL<=0.05 MIU/L-ACNC: 2.71 UIU/ML (ref 0.45–4.5)
WBC # BLD AUTO: 3.97 THOUSAND/UL (ref 4.31–10.16)

## 2025-03-28 PROCEDURE — 80061 LIPID PANEL: CPT

## 2025-03-28 PROCEDURE — 36415 COLL VENOUS BLD VENIPUNCTURE: CPT

## 2025-03-28 PROCEDURE — 84443 ASSAY THYROID STIM HORMONE: CPT

## 2025-03-28 PROCEDURE — 85025 COMPLETE CBC W/AUTO DIFF WBC: CPT

## 2025-03-28 PROCEDURE — 80053 COMPREHEN METABOLIC PANEL: CPT
